# Patient Record
(demographics unavailable — no encounter records)

---

## 2025-03-19 NOTE — END OF VISIT
[FreeTextEntry3] : I, Ameliesaritha Arenasmarleen, acted as a scribe on behalf of Dr. Prakash on 03/19/2025 .  All medical entries made by the scribe were at my, Dr. Prakash, direction and personally dictated by me on 03/19/2025 . I have reviewed the chart and agree that the record accurately reflects my personal performance of the history, physical exam, assessment and plan. I have also personally directed, reviewed, and agreed with the chart.

## 2025-03-19 NOTE — HISTORY OF PRESENT ILLNESS
[FreeTextEntry1] :  33 year old G0 female presents to establish care and for an annual. Pt reports her last GYN visit was about 13 months ago. She reports being Dx with mild PCOS. Her OCP she takes right now she does not receive a menses. . Pt would like all STD testing today. She would like rx for sono as well. She reports tender breasts around time of period. She reports not getting HPV Vax, has h/o of abnormal paps and HPV+.   states prior to OCP was getting regular cycles  GYNHx: HPV+, h/o of abnormal paps and cervical cyro/freezing, states last abnormal 5 years ago  PMHx: PCOS, anxiety, depression PSHx: Tooth removal  Denies drinking, smoking, drug use. Denies FamHx.

## 2025-03-19 NOTE — PLAN
[FreeTextEntry1] :  33 year old G0 female presents to establish care and for an annual.   -Pap/HPV done -GC/CT -STI testing -Rx breast sono -OCP refilled -discused PCOS and infertility, relation to htn, dm2 -RTO 1 year for annual Lila Prakash MD